# Patient Record
Sex: FEMALE | HISPANIC OR LATINO
[De-identification: names, ages, dates, MRNs, and addresses within clinical notes are randomized per-mention and may not be internally consistent; named-entity substitution may affect disease eponyms.]

---

## 2017-01-31 ENCOUNTER — MEDICATION RENEWAL (OUTPATIENT)
Age: 37
End: 2017-01-31

## 2017-03-31 ENCOUNTER — MEDICATION RENEWAL (OUTPATIENT)
Age: 37
End: 2017-03-31

## 2017-04-17 ENCOUNTER — MEDICATION RENEWAL (OUTPATIENT)
Age: 37
End: 2017-04-17

## 2017-05-11 ENCOUNTER — MEDICATION RENEWAL (OUTPATIENT)
Age: 37
End: 2017-05-11

## 2017-05-18 ENCOUNTER — MEDICATION RENEWAL (OUTPATIENT)
Age: 37
End: 2017-05-18

## 2017-08-11 ENCOUNTER — APPOINTMENT (OUTPATIENT)
Dept: ENDOCRINOLOGY | Facility: CLINIC | Age: 37
End: 2017-08-11

## 2017-10-30 ENCOUNTER — TRANSCRIPTION ENCOUNTER (OUTPATIENT)
Age: 37
End: 2017-10-30

## 2017-11-03 ENCOUNTER — TRANSCRIPTION ENCOUNTER (OUTPATIENT)
Age: 37
End: 2017-11-03

## 2017-11-10 ENCOUNTER — APPOINTMENT (OUTPATIENT)
Dept: ENDOCRINOLOGY | Facility: CLINIC | Age: 37
End: 2017-11-10
Payer: COMMERCIAL

## 2017-11-10 VITALS
HEART RATE: 68 BPM | DIASTOLIC BLOOD PRESSURE: 79 MMHG | HEIGHT: 64 IN | WEIGHT: 171 LBS | BODY MASS INDEX: 29.19 KG/M2 | SYSTOLIC BLOOD PRESSURE: 117 MMHG

## 2017-11-10 PROCEDURE — 99214 OFFICE O/P EST MOD 30 MIN: CPT

## 2017-11-10 RX ORDER — SERTRALINE HYDROCHLORIDE 100 MG/1
100 TABLET, FILM COATED ORAL
Qty: 30 | Refills: 0 | Status: ACTIVE | COMMUNITY
Start: 2017-04-06

## 2017-11-13 ENCOUNTER — TRANSCRIPTION ENCOUNTER (OUTPATIENT)
Age: 37
End: 2017-11-13

## 2017-11-13 LAB
ALBUMIN SERPL ELPH-MCNC: 4.3 G/DL
ALP BLD-CCNC: 51 U/L
ALT SERPL-CCNC: 16 U/L
ANION GAP SERPL CALC-SCNC: 14 MMOL/L
AST SERPL-CCNC: 13 U/L
BILIRUB SERPL-MCNC: 0.2 MG/DL
BUN SERPL-MCNC: 12 MG/DL
CALCIUM SERPL-MCNC: 9.3 MG/DL
CHLORIDE SERPL-SCNC: 100 MMOL/L
CHOLEST SERPL-MCNC: 289 MG/DL
CHOLEST/HDLC SERPL: 6.4 RATIO
CO2 SERPL-SCNC: 28 MMOL/L
CREAT SERPL-MCNC: 0.49 MG/DL
CREAT SPEC-SCNC: 29 MG/DL
GLUCOSE SERPL-MCNC: 92 MG/DL
HBA1C MFR BLD HPLC: 7 %
HDLC SERPL-MCNC: 45 MG/DL
LDLC SERPL CALC-MCNC: 180 MG/DL
MICROALBUMIN 24H UR DL<=1MG/L-MCNC: 0.7 MG/DL
MICROALBUMIN/CREAT 24H UR-RTO: 24 MG/G
POTASSIUM SERPL-SCNC: 3.7 MMOL/L
PROT SERPL-MCNC: 7.3 G/DL
SODIUM SERPL-SCNC: 142 MMOL/L
TRIGL SERPL-MCNC: 319 MG/DL
TSH SERPL-ACNC: 3.4 UIU/ML

## 2017-11-13 RX ORDER — LIRAGLUTIDE 6 MG/ML
18 INJECTION SUBCUTANEOUS
Qty: 1 | Refills: 5 | Status: DISCONTINUED | COMMUNITY
Start: 2017-11-10 | End: 2017-11-13

## 2018-02-09 ENCOUNTER — MEDICATION RENEWAL (OUTPATIENT)
Age: 38
End: 2018-02-09

## 2018-02-12 ENCOUNTER — APPOINTMENT (OUTPATIENT)
Dept: ENDOCRINOLOGY | Facility: CLINIC | Age: 38
End: 2018-02-12
Payer: COMMERCIAL

## 2018-02-12 VITALS
BODY MASS INDEX: 27.53 KG/M2 | DIASTOLIC BLOOD PRESSURE: 72 MMHG | HEIGHT: 64 IN | SYSTOLIC BLOOD PRESSURE: 113 MMHG | WEIGHT: 161.25 LBS | HEART RATE: 66 BPM

## 2018-02-12 PROCEDURE — 99214 OFFICE O/P EST MOD 30 MIN: CPT

## 2018-02-13 LAB
ANION GAP SERPL CALC-SCNC: 12 MMOL/L
BUN SERPL-MCNC: 12 MG/DL
CALCIUM SERPL-MCNC: 9.6 MG/DL
CHLORIDE SERPL-SCNC: 99 MMOL/L
CO2 SERPL-SCNC: 27 MMOL/L
CREAT SERPL-MCNC: 0.68 MG/DL
GLUCOSE SERPL-MCNC: 209 MG/DL
HBA1C MFR BLD HPLC: 6.6 %
POTASSIUM SERPL-SCNC: 4.1 MMOL/L
SODIUM SERPL-SCNC: 138 MMOL/L

## 2018-08-10 ENCOUNTER — APPOINTMENT (OUTPATIENT)
Dept: ENDOCRINOLOGY | Facility: CLINIC | Age: 38
End: 2018-08-10
Payer: COMMERCIAL

## 2018-08-10 VITALS
SYSTOLIC BLOOD PRESSURE: 115 MMHG | BODY MASS INDEX: 25.95 KG/M2 | HEART RATE: 68 BPM | DIASTOLIC BLOOD PRESSURE: 72 MMHG | HEIGHT: 64 IN | WEIGHT: 152 LBS

## 2018-08-10 PROCEDURE — 99213 OFFICE O/P EST LOW 20 MIN: CPT

## 2018-08-20 LAB
ANION GAP SERPL CALC-SCNC: 14 MMOL/L
BUN SERPL-MCNC: 11 MG/DL
CALCIUM SERPL-MCNC: 9.1 MG/DL
CHLORIDE SERPL-SCNC: 100 MMOL/L
CHOLEST SERPL-MCNC: 178 MG/DL
CHOLEST/HDLC SERPL: 4.3 RATIO
CO2 SERPL-SCNC: 27 MMOL/L
CREAT SERPL-MCNC: 0.65 MG/DL
CREAT SPEC-SCNC: 182 MG/DL
GLUCOSE SERPL-MCNC: 132 MG/DL
HBA1C MFR BLD HPLC: 7.1 %
HDLC SERPL-MCNC: 41 MG/DL
LDLC SERPL CALC-MCNC: 92 MG/DL
MICROALBUMIN 24H UR DL<=1MG/L-MCNC: 1.2 MG/DL
MICROALBUMIN/CREAT 24H UR-RTO: 7 MG/G
POTASSIUM SERPL-SCNC: 4.4 MMOL/L
SODIUM SERPL-SCNC: 141 MMOL/L
TRIGL SERPL-MCNC: 226 MG/DL

## 2019-01-09 ENCOUNTER — RX RENEWAL (OUTPATIENT)
Age: 39
End: 2019-01-09

## 2019-01-16 ENCOUNTER — APPOINTMENT (OUTPATIENT)
Dept: ENDOCRINOLOGY | Facility: CLINIC | Age: 39
End: 2019-01-16
Payer: COMMERCIAL

## 2019-01-16 VITALS
BODY MASS INDEX: 27.14 KG/M2 | HEIGHT: 64 IN | SYSTOLIC BLOOD PRESSURE: 105 MMHG | HEART RATE: 68 BPM | DIASTOLIC BLOOD PRESSURE: 64 MMHG | WEIGHT: 159 LBS

## 2019-01-16 PROCEDURE — 99214 OFFICE O/P EST MOD 30 MIN: CPT

## 2019-01-16 RX ORDER — DULAGLUTIDE 1.5 MG/.5ML
1.5 INJECTION, SOLUTION SUBCUTANEOUS
Qty: 2 | Refills: 5 | Status: DISCONTINUED | COMMUNITY
Start: 2017-11-13 | End: 2019-01-16

## 2019-01-16 NOTE — HISTORY OF PRESENT ILLNESS
[FreeTextEntry1] : here with daughter\par glucose have been increasing, now taking Humalog with every meal.\par going to [place] next week to bring back . gets stressed taking care of two daughters by herself.\par last night, was ravenous, after eating cereal for dinner, was still hungry and ate farina.\par loves to eat\par Trulicity not suppressing appetite like it did in the beginning\par glucose usually in the 200s.  in the mornings, 160-170, nothing below 130.\par no polyuria, polydipsia, SOB, chest pain, or neuropathy symptoms \par \par Meds:\par metformin ER 750mg TID\par Humalog 6-12 units for carby meals\par atorvastatin 10mg\par lisinopril 2.5mg\par zoloft 100mg\par clindamycin gel for acne\par Previously tried: victoza (nausea)

## 2019-01-16 NOTE — DATA REVIEWED
[FreeTextEntry1] : 8/18: A1c 7.1%, tot chol 178, trig 226, HDL 41, LDL 92, urine microalb/cr 7\par 11/17: A1c 7.0%, tot chol 289, trig 319, HDL 45, , urine microalb/cr 24, TSH 3.40\par 12/16: A1c 6.5%, Cr 0.58, TSH 1.74, urine microalb/cr 10\par 6/16: A1c 6.2%, Cr 0.65\par 2/4/16: A1c 6.0%, fructosamine 232

## 2019-01-16 NOTE — ASSESSMENT
[FreeTextEntry1] : Diabetes, A1c goal < 7.0%.   Change Trulicity to Bydureon (different GLP1 and more likely to last 7 days); continue metformin and Humalog. Advised pt to add protein and fats to her meals.  Right now, is eating mostly carbs for meals which is less filling and she may get hungry again soon after eating. \par RTO 6 months

## 2019-01-16 NOTE — PHYSICAL EXAM
[Alert] : alert [Healthy Appearance] : healthy appearance [Normal Voice/Communication] : normal voice communication [No Proptosis] : no proptosis [No Lid Lag] : no lid lag [Normal Hearing] : hearing was normal [Thyroid Not Enlarged] : the thyroid was not enlarged [No Thyroid Nodules] : there were no palpable thyroid nodules [Clear to Auscultation] : lungs were clear to auscultation bilaterally [Normal S1, S2] : normal S1 and S2 [Regular Rhythm] : with a regular rhythm [Pedal Pulses Normal] : the pedal pulses are present [No Edema] : there was no peripheral edema [No Stigmata of Cushings Syndrome] : no stigmata of cushings syndrome [Normal Sensation on Monofilament Testing] : normal sensation on monofilament testing of lower extremities [Normal Affect] : the affect was normal [Normal Mood] : the mood was normal [Foot Ulcers] : no foot ulcers [de-identified] : moderate acanthosis nigricans

## 2019-01-17 LAB
ANION GAP SERPL CALC-SCNC: 13 MMOL/L
BUN SERPL-MCNC: 12 MG/DL
CALCIUM SERPL-MCNC: 10 MG/DL
CHLORIDE SERPL-SCNC: 97 MMOL/L
CO2 SERPL-SCNC: 28 MMOL/L
CREAT SERPL-MCNC: 0.69 MG/DL
GLUCOSE SERPL-MCNC: 120 MG/DL
HBA1C MFR BLD HPLC: 7.5 %
POTASSIUM SERPL-SCNC: 3.9 MMOL/L
SODIUM SERPL-SCNC: 138 MMOL/L

## 2019-02-08 ENCOUNTER — APPOINTMENT (OUTPATIENT)
Dept: ENDOCRINOLOGY | Facility: CLINIC | Age: 39
End: 2019-02-08

## 2019-03-25 ENCOUNTER — MED ADMIN CHARGE (OUTPATIENT)
Age: 39
End: 2019-03-25

## 2019-03-25 ENCOUNTER — MEDICATION RENEWAL (OUTPATIENT)
Age: 39
End: 2019-03-25

## 2019-05-17 ENCOUNTER — APPOINTMENT (OUTPATIENT)
Dept: ENDOCRINOLOGY | Facility: CLINIC | Age: 39
End: 2019-05-17

## 2019-06-10 ENCOUNTER — TRANSCRIPTION ENCOUNTER (OUTPATIENT)
Age: 39
End: 2019-06-10

## 2019-06-12 ENCOUNTER — APPOINTMENT (OUTPATIENT)
Dept: ENDOCRINOLOGY | Facility: CLINIC | Age: 39
End: 2019-06-12
Payer: COMMERCIAL

## 2019-06-12 VITALS
HEIGHT: 64 IN | SYSTOLIC BLOOD PRESSURE: 113 MMHG | BODY MASS INDEX: 28 KG/M2 | HEART RATE: 68 BPM | WEIGHT: 164 LBS | DIASTOLIC BLOOD PRESSURE: 72 MMHG

## 2019-06-12 DIAGNOSIS — E11.9 TYPE 2 DIABETES MELLITUS W/OUT COMPLICATIONS: ICD-10-CM

## 2019-06-12 LAB
GLUCOSE BLDC GLUCOMTR-MCNC: 185
HBA1C MFR BLD HPLC: 8.4

## 2019-06-12 PROCEDURE — 99214 OFFICE O/P EST MOD 30 MIN: CPT | Mod: 25

## 2019-06-12 PROCEDURE — 82962 GLUCOSE BLOOD TEST: CPT

## 2019-06-12 PROCEDURE — 83036 HEMOGLOBIN GLYCOSYLATED A1C: CPT | Mod: QW

## 2019-06-12 RX ORDER — FLASH GLUCOSE SCANNING READER
EACH MISCELLANEOUS
Qty: 1 | Refills: 0 | Status: ACTIVE | COMMUNITY
Start: 2019-06-12 | End: 1900-01-01

## 2019-06-12 RX ORDER — FLASH GLUCOSE SENSOR
KIT MISCELLANEOUS
Qty: 2 | Refills: 5 | Status: ACTIVE | COMMUNITY
Start: 2019-06-12 | End: 1900-01-01

## 2019-06-13 ENCOUNTER — TRANSCRIPTION ENCOUNTER (OUTPATIENT)
Age: 39
End: 2019-06-13

## 2019-06-13 LAB
ALBUMIN SERPL ELPH-MCNC: 4.8 G/DL
ALP BLD-CCNC: 49 U/L
ALT SERPL-CCNC: 28 U/L
ANION GAP SERPL CALC-SCNC: 11 MMOL/L
AST SERPL-CCNC: 15 U/L
BASOPHILS # BLD AUTO: 0.06 K/UL
BASOPHILS NFR BLD AUTO: 0.8 %
BILIRUB SERPL-MCNC: 0.4 MG/DL
BUN SERPL-MCNC: 14 MG/DL
CALCIUM SERPL-MCNC: 9.6 MG/DL
CHLORIDE SERPL-SCNC: 96 MMOL/L
CHOLEST SERPL-MCNC: 374 MG/DL
CHOLEST/HDLC SERPL: 10.1 RATIO
CO2 SERPL-SCNC: 29 MMOL/L
CREAT SERPL-MCNC: 0.63 MG/DL
CREAT SPEC-SCNC: 25 MG/DL
EOSINOPHIL # BLD AUTO: 0.17 K/UL
EOSINOPHIL NFR BLD AUTO: 2.3 %
ESTIMATED AVERAGE GLUCOSE: 197 MG/DL
GLUCOSE SERPL-MCNC: 164 MG/DL
HBA1C MFR BLD HPLC: 8.5 %
HCT VFR BLD CALC: 42.8 %
HDLC SERPL-MCNC: 37 MG/DL
HGB BLD-MCNC: 14.5 G/DL
IMM GRANULOCYTES NFR BLD AUTO: 0.1 %
LDLC SERPL CALC-MCNC: NORMAL MG/DL
LYMPHOCYTES # BLD AUTO: 2.57 K/UL
LYMPHOCYTES NFR BLD AUTO: 34.5 %
MAN DIFF?: NORMAL
MCHC RBC-ENTMCNC: 30.4 PG
MCHC RBC-ENTMCNC: 33.9 GM/DL
MCV RBC AUTO: 89.7 FL
MICROALBUMIN 24H UR DL<=1MG/L-MCNC: <1.2 MG/DL
MICROALBUMIN/CREAT 24H UR-RTO: NORMAL MG/G
MONOCYTES # BLD AUTO: 0.61 K/UL
MONOCYTES NFR BLD AUTO: 8.2 %
NEUTROPHILS # BLD AUTO: 4.03 K/UL
NEUTROPHILS NFR BLD AUTO: 54.1 %
PLATELET # BLD AUTO: 277 K/UL
POTASSIUM SERPL-SCNC: 3.9 MMOL/L
PROT SERPL-MCNC: 7.4 G/DL
RBC # BLD: 4.77 M/UL
RBC # FLD: 11.7 %
SODIUM SERPL-SCNC: 136 MMOL/L
TRIGL SERPL-MCNC: 583 MG/DL
WBC # FLD AUTO: 7.45 K/UL

## 2019-06-13 NOTE — REASON FOR VISIT
[Follow-Up: _____] : a [unfilled] follow-up visit [FreeTextEntry1] : type 2 diabetes mellitus uncontrolled without complications

## 2019-06-13 NOTE — HISTORY OF PRESENT ILLNESS
[FreeTextEntry1] : Patient returns for follow up of T2DM.  This is the first time I am meeting her.\par here with daughter\par Taking humalog with every meal, no basal insulin.  Tends to take 3 units with breakfast (Edson bread, peanut butter, full banana), 5 units with lunch (meat and rice or pizza) and 4 units with dinner (meat and rice or cereal).\par Also taking metformin 750 mg BID and trulicity 1.5 mg weekly.  She has not yet switched to bydureon because she already paid for trulicity but plans to change soon.\par  is in [place], having issues with immigration, very stressful.  gets stressed taking care of two daughters by herself.\par Trulicity not suppressing appetite like it did in the beginning\par No meter today.  Glucose  in the mornings, 150-180, 1 hr after a meal will be 180-250\par \par Ophthalmology Up to date\par \par Meds:\par metformin ER 750mg TID\par Humalog 3-5 untis with meals (had been taking 6-12 units with starchy meals previously)\par atorvastatin 10mg\par lisinopril 2.5mg\par zoloft 100mg\par Trulicity 1.5\par clindamycin gel for acne\par Previously tried: victoza (nausea)

## 2019-06-13 NOTE — ASSESSMENT
[Long Term Vascular Complications] : long term vascular complications of diabetes [FreeTextEntry1] : Jeannie Robison is a 39 year old female who returns for follow up of type 2 diabetes mellitus.  This is the first time I am meeting her.\par Diabetes, A1c goal < 7.0%.   POC A1C today is 8.4% and glucose is 185 mg/dL.\par She has been under a lot of stress lately, having issues with immigration,  is in [place], she is taking care of two girls by herself.\par Change Trulicity to Bydureon (different GLP1 and more likely to last 7 days); continue metformin\par She is taking humalog with meals but per report, fasting BGs significantly elevated above goal.  Offered basal insulin, but she is not keen on a fourth daily injection.  Switch to humalog 75/25 7 units BID with breakfast and dinner.  She reliably eats three meals per day.  Discussed insulin must be taken with a meal and gave samples today.\par Check labs today for A1C, CMP. CBC, lipids, microalbumin\par Ophthalmology Up to date\par Rx Freestyle Seamus CGM\par RTC 2 months, call in one week to discuss blood sugar readings [Action and use of Insulin] : action and use of short and long-acting insulin [Importance of Diet and Exercise] : importance of diet and exercise to improve glycemic control, achieve weight loss and improve cardiovascular health [Self Monitoring of Blood Glucose] : self monitoring of blood glucose [Insulin Self-Administration] : insulin self-administration [Retinopathy Screening] : Patient was referred to ophthalmology for retinopathy screening [Injection Technique, Storage, Sharps Disposal] : injection technique, storage, and sharps disposal

## 2019-06-13 NOTE — REVIEW OF SYSTEMS
[Stress] : stress [Decreased Appetite] : appetite not decreased [Blurry Vision] : no blurred vision [de-identified] : Some tingling in fingers when she wakes up but no numbness/tingling in feet

## 2019-07-15 ENCOUNTER — MEDICATION RENEWAL (OUTPATIENT)
Age: 39
End: 2019-07-15

## 2019-07-15 RX ORDER — METFORMIN ER 750 MG 750 MG/1
750 TABLET ORAL
Qty: 60 | Refills: 5 | Status: DISCONTINUED | COMMUNITY
Start: 2017-04-25 | End: 2019-07-15

## 2019-07-16 ENCOUNTER — TRANSCRIPTION ENCOUNTER (OUTPATIENT)
Age: 39
End: 2019-07-16

## 2019-07-16 ENCOUNTER — RX RENEWAL (OUTPATIENT)
Age: 39
End: 2019-07-16

## 2019-08-27 ENCOUNTER — APPOINTMENT (OUTPATIENT)
Dept: ENDOCRINOLOGY | Facility: CLINIC | Age: 39
End: 2019-08-27
Payer: COMMERCIAL

## 2019-08-27 VITALS
HEART RATE: 59 BPM | DIASTOLIC BLOOD PRESSURE: 67 MMHG | BODY MASS INDEX: 28.15 KG/M2 | SYSTOLIC BLOOD PRESSURE: 106 MMHG | WEIGHT: 164 LBS

## 2019-08-27 PROCEDURE — 99214 OFFICE O/P EST MOD 30 MIN: CPT

## 2019-08-27 RX ORDER — DULAGLUTIDE 1.5 MG/.5ML
1.5 INJECTION, SOLUTION SUBCUTANEOUS
Qty: 4 | Refills: 5 | Status: DISCONTINUED | COMMUNITY
Start: 2019-03-25 | End: 2019-08-27

## 2019-08-28 NOTE — HISTORY OF PRESENT ILLNESS
[FreeTextEntry1] : Seamus CGM reviewed\par 14d avg   197 .  \par 30d avg   196 . highest glucose 3-6pm, in the 230s\par daily graph showing multiple spikes during day from snacking after lunch. (spike for lunch, snack and dinner)\par stress eating due to 's situation.  also is losing hair from stress.\par no polyuria, polydipsia, SOB, chest pain, or neuropathy symptoms \par \par Meds:\par metformin ER 750mg TID\par Humalog 75/25, 7 units BID with meals\par Bydureon 2mg/week\par atorvastatin 10mg\par lisinopril 2.5mg\par zoloft 100mg\par clindamycin gel for acne\par Previously tried: victoza (nausea), Trulicity (lost effectiveness)

## 2019-08-28 NOTE — ASSESSMENT
[FreeTextEntry1] : Diabetes, A1c goal < 7.0%.    Hyperglycemia due to frequent eating.\par Increase 75/25 mix: 7 units with breakfast, 10-12 units with dinner.\par recommended not eating afternoon snack.  try instead to eat earlier dinner and not have late afternoon snack.\par continue metformin and Bydureon.\par RTO 3 months

## 2019-08-28 NOTE — DATA REVIEWED
[FreeTextEntry1] : 6/19: A1c 8.5%, urine microalbumin < 1.2, tot chol 374, trig 583, HDL 37\par 1/19: A1c 7.5%\par 8/18: A1c 7.1%, tot chol 178, trig 226, HDL 41, LDL 92, urine microalb/cr 7\par 11/17: A1c 7.0%, tot chol 289, trig 319, HDL 45, , urine microalb/cr 24, TSH 3.40\par 12/16: A1c 6.5%, Cr 0.58, TSH 1.74, urine microalb/cr 10\par 6/16: A1c 6.2%, Cr 0.65\par 2/4/16: A1c 6.0%, fructosamine 232

## 2019-08-30 ENCOUNTER — TRANSCRIPTION ENCOUNTER (OUTPATIENT)
Age: 39
End: 2019-08-30

## 2019-09-04 ENCOUNTER — TRANSCRIPTION ENCOUNTER (OUTPATIENT)
Age: 39
End: 2019-09-04

## 2019-09-05 ENCOUNTER — TRANSCRIPTION ENCOUNTER (OUTPATIENT)
Age: 39
End: 2019-09-05

## 2019-09-06 ENCOUNTER — TRANSCRIPTION ENCOUNTER (OUTPATIENT)
Age: 39
End: 2019-09-06

## 2019-11-19 ENCOUNTER — APPOINTMENT (OUTPATIENT)
Dept: ENDOCRINOLOGY | Facility: CLINIC | Age: 39
End: 2019-11-19

## 2019-12-05 ENCOUNTER — APPOINTMENT (OUTPATIENT)
Dept: ENDOCRINOLOGY | Facility: CLINIC | Age: 39
End: 2019-12-05

## 2019-12-05 ENCOUNTER — APPOINTMENT (OUTPATIENT)
Dept: ENDOCRINOLOGY | Facility: CLINIC | Age: 39
End: 2019-12-05
Payer: COMMERCIAL

## 2019-12-05 VITALS
HEART RATE: 67 BPM | SYSTOLIC BLOOD PRESSURE: 108 MMHG | BODY MASS INDEX: 28.67 KG/M2 | DIASTOLIC BLOOD PRESSURE: 71 MMHG | WEIGHT: 167 LBS

## 2019-12-05 PROCEDURE — 99214 OFFICE O/P EST MOD 30 MIN: CPT

## 2019-12-05 NOTE — PHYSICAL EXAM
[Alert] : alert [Healthy Appearance] : healthy appearance [Normal Voice/Communication] : normal voice communication [No Lid Lag] : no lid lag [No Proptosis] : no proptosis [Normal Hearing] : hearing was normal [Thyroid Not Enlarged] : the thyroid was not enlarged [No Thyroid Nodules] : there were no palpable thyroid nodules [Clear to Auscultation] : lungs were clear to auscultation bilaterally [Normal S1, S2] : normal S1 and S2 [Regular Rhythm] : with a regular rhythm [Pedal Pulses Normal] : the pedal pulses are present [No Edema] : there was no peripheral edema [No Stigmata of Cushings Syndrome] : no stigmata of cushings syndrome [Normal Sensation on Monofilament Testing] : normal sensation on monofilament testing of lower extremities [Normal Affect] : the affect was normal [Normal Mood] : the mood was normal [Foot Ulcers] : no foot ulcers [de-identified] : augustin 147, decreasing [de-identified] : moderate acanthosis nigricans

## 2019-12-05 NOTE — HISTORY OF PRESENT ILLNESS
[FreeTextEntry1] : Seamus CGM reviewed\par 14d avg   179.  \par 30d avg   169 . highest from 9am to midnight.  180-220.\par today woke up at 201.  ate cereal for breakfast, took 10 units 75/25 mix, and then was 329.  [place] 3 units Humalog (has some at home), and now is 147.  usually eats alec bread in the morning (not cereal)\par thinks Bydureon is not doing much for her sugars.   Also does not feel reduction in appetite (still eating a lot) but some times will get GERD symptoms.  also getting bumps under skin.\par  still not home, maybe in a few more months, so still very stress, gets anxious\par was off Zoloft for 1 month and felt bad.  crying less when taking zoloft.\par gets palpitations, SOB, anxiety at times. (could be when kids are bickering)\par has headaches when glucose is high, and also numbness in her fingers\par no polyuria, polydipsia, SOB\par got flu vaccine.\par may be due for ophtho, needs to check\par \par Meds:\par metformin ER 750mg TID\par Humalog 75/25, 8 units with breakfast\par Bydureon 2mg/week\par atorvastatin 10mg\par lisinopril 2.5mg\par zoloft 100mg\par clindamycin gel for acne\par Previously tried: victoza (nausea), Trulicity (lost effectiveness)

## 2019-12-05 NOTE — ASSESSMENT
[FreeTextEntry1] : Diabetes, A1c goal < 7.0%.    Hyperglycemia due to eating carb-rich diet.\par Increase 75/25 mix: 10-12 units with breakfast.\par change Bydureon to Ozempic (the only GLP1 agonist she has not tried yet), 1mg/week.  can reduce dose, if she has GI symptoms with 1mg/week dose of Ozempic.\par refer to Jyothi to talk through her stress/anxiety\par RTO 3 months

## 2019-12-06 LAB
ANION GAP SERPL CALC-SCNC: 15 MMOL/L
BUN SERPL-MCNC: 12 MG/DL
CALCIUM SERPL-MCNC: 10.2 MG/DL
CHLORIDE SERPL-SCNC: 99 MMOL/L
CO2 SERPL-SCNC: 27 MMOL/L
CREAT SERPL-MCNC: 0.59 MG/DL
ESTIMATED AVERAGE GLUCOSE: 177 MG/DL
GLUCOSE SERPL-MCNC: 86 MG/DL
HBA1C MFR BLD HPLC: 7.8 %
POTASSIUM SERPL-SCNC: 4.2 MMOL/L
SODIUM SERPL-SCNC: 141 MMOL/L

## 2019-12-09 ENCOUNTER — APPOINTMENT (OUTPATIENT)
Dept: ENDOCRINOLOGY | Facility: CLINIC | Age: 39
End: 2019-12-09

## 2019-12-16 ENCOUNTER — APPOINTMENT (OUTPATIENT)
Dept: ENDOCRINOLOGY | Facility: CLINIC | Age: 39
End: 2019-12-16

## 2020-01-27 ENCOUNTER — APPOINTMENT (OUTPATIENT)
Dept: ENDOCRINOLOGY | Facility: CLINIC | Age: 40
End: 2020-01-27

## 2020-03-19 ENCOUNTER — RX RENEWAL (OUTPATIENT)
Age: 40
End: 2020-03-19

## 2020-05-14 ENCOUNTER — TRANSCRIPTION ENCOUNTER (OUTPATIENT)
Age: 40
End: 2020-05-14

## 2020-05-15 ENCOUNTER — TRANSCRIPTION ENCOUNTER (OUTPATIENT)
Age: 40
End: 2020-05-15

## 2020-05-16 ENCOUNTER — TRANSCRIPTION ENCOUNTER (OUTPATIENT)
Age: 40
End: 2020-05-16

## 2020-08-31 ENCOUNTER — APPOINTMENT (OUTPATIENT)
Dept: ENDOCRINOLOGY | Facility: CLINIC | Age: 40
End: 2020-08-31
Payer: COMMERCIAL

## 2020-08-31 VITALS
WEIGHT: 162 LBS | BODY MASS INDEX: 27.81 KG/M2 | SYSTOLIC BLOOD PRESSURE: 126 MMHG | DIASTOLIC BLOOD PRESSURE: 79 MMHG | HEART RATE: 72 BPM

## 2020-08-31 PROCEDURE — 99214 OFFICE O/P EST MOD 30 MIN: CPT

## 2020-08-31 RX ORDER — SEMAGLUTIDE 1.34 MG/ML
2 INJECTION, SOLUTION SUBCUTANEOUS
Qty: 1 | Refills: 5 | Status: ACTIVE | COMMUNITY
Start: 2019-12-05 | End: 1900-01-01

## 2020-08-31 RX ORDER — CLINDAMYCIN PHOSPHATE 10 MG/ML
1 SOLUTION TOPICAL
Qty: 60 | Refills: 0 | Status: DISCONTINUED | COMMUNITY
Start: 2017-07-27 | End: 2020-08-31

## 2020-08-31 RX ORDER — ROSUVASTATIN CALCIUM 5 MG/1
5 TABLET, FILM COATED ORAL DAILY
Qty: 30 | Refills: 5 | Status: DISCONTINUED | COMMUNITY
Start: 2019-08-27 | End: 2020-08-31

## 2020-08-31 RX ORDER — DAPSONE 75 MG/G
7.5 GEL TOPICAL
Qty: 60 | Refills: 0 | Status: ACTIVE | COMMUNITY
Start: 2020-06-23

## 2020-08-31 RX ORDER — DOXYCYCLINE HYCLATE 100 MG/1
100 TABLET ORAL
Qty: 30 | Refills: 0 | Status: ACTIVE | COMMUNITY
Start: 2020-06-23

## 2020-08-31 RX ORDER — CICLOPIROX 7.7 MG/G
0.77 GEL TOPICAL
Qty: 45 | Refills: 0 | Status: ACTIVE | COMMUNITY
Start: 2020-06-23

## 2020-08-31 NOTE — PHYSICAL EXAM
[Alert] : alert [Healthy Appearance] : healthy appearance [No Proptosis] : no proptosis [No Lid Lag] : no lid lag [Normal Hearing] : hearing was normal [No LAD] : no lymphadenopathy [Thyroid Not Enlarged] : the thyroid was not enlarged [Clear to Auscultation] : lungs were clear to auscultation bilaterally [Normal S1, S2] : normal S1 and S2 [Regular Rhythm] : with a regular rhythm [No Edema] : no peripheral edema [Pedal Pulses Normal] : the pedal pulses are present [Foot Ulcers] : no foot ulcers [Normal Sensation on Monofilament Testing] : normal sensation on monofilament testing of lower extremities [Normal Affect] : the affect was normal [Normal Mood] : the mood was normal [de-identified] : no onychomycoses, mild acanthosis nigricans

## 2020-08-31 NOTE — HISTORY OF PRESENT ILLNESS
[FreeTextEntry1] : Happier now,  is finally back home.  But still gets stressed because is working from home and kids will start school soon, on line.  Still gets anxious and feels overwhelmed sometimes.\lele was off Ozempic for a while due to nausea.  SHe would be very nauseous for 3-4 days following injection.\lele Started running again, 3x/week.  SHe used to be a runner but then was not able to with  being away and having young kids.   weight is 5 lb less than last visit.\lele has not been testing glucose recently, had run out of supplies.  glucose was 280 recently. doesn't have polyuria, polydipsia liks she used when glucose is high.  Gets headaches when glucose over 250.   no blurry vision\par some slight numbness in her fingertips, intermittently.  no neuropathy in her feet.\par \par Meds:\par metformin ER 750mg TID\par Humalog 75/25, 8 units with breakfast, 8-10 with dinner\par Ozempic 1mg/week -- not taking\par atorvastatin 10mg\par lisinopril 2.5mg\par zoloft 100mg\par clindamycin gel for acne\par Previously tried: victoza (nausea), Trulicity (lost effectiveness)

## 2020-08-31 NOTE — DATA REVIEWED
[FreeTextEntry1] : 12/19: A1c 7.8%, Cr 0.59\par 6/19: A1c 8.5%, urine microalbumin < 1.2, tot chol 374, trig 583, HDL 37\par 1/19: A1c 7.5%\par 8/18: A1c 7.1%, tot chol 178, trig 226, HDL 41, LDL 92, urine microalb/cr 7\par 11/17: A1c 7.0%, tot chol 289, trig 319, HDL 45, , urine microalb/cr 24, TSH 3.40\par 12/16: A1c 6.5%, Cr 0.58, TSH 1.74, urine microalb/cr 10\par 6/16: A1c 6.2%, Cr 0.65\par 2/4/16: A1c 6.0%, fructosamine 232

## 2020-08-31 NOTE — ASSESSMENT
[FreeTextEntry1] : Diabetes, A1c goal < 7.0%.    suboptimal (I suspect).  no known complications.\par continue pre mixed regimen.  I need more glucose readings before I can make insulin adjustments.  Will check A1c today.  adivsed to keep lunch small, since she is not injecting insulin at that time; but if eating a large lunch, can give herself 4 units "booster" dose for lunch. (but I would rather she keep a small lunch).\par Reduce Ozempic to 0.5mg/week to see if lower dose is better tolerated.  if still nauseous, can reduce further, to 0.25mg/week.\par continue metformin\par continue regular exercise; try to increase to 5x/week.  Doesn't have to run 5 days/week, can do other exercise (walking, exercise video).  Explained to pt that it is ok (and good) to take time for herself, and take a step back if she is feeling overwhelmed.\par continue statin therapy\par RTO 4 months

## 2020-09-01 LAB
ALBUMIN SERPL ELPH-MCNC: 4.6 G/DL
ALP BLD-CCNC: 51 U/L
ALT SERPL-CCNC: 18 U/L
ANION GAP SERPL CALC-SCNC: 14 MMOL/L
AST SERPL-CCNC: 12 U/L
BILIRUB SERPL-MCNC: 0.4 MG/DL
BUN SERPL-MCNC: 9 MG/DL
CALCIUM SERPL-MCNC: 9.3 MG/DL
CHLORIDE SERPL-SCNC: 100 MMOL/L
CHOLEST SERPL-MCNC: 247 MG/DL
CHOLEST/HDLC SERPL: 6.1 RATIO
CO2 SERPL-SCNC: 26 MMOL/L
CREAT SERPL-MCNC: 0.6 MG/DL
ESTIMATED AVERAGE GLUCOSE: 183 MG/DL
GLUCOSE SERPL-MCNC: 229 MG/DL
HBA1C MFR BLD HPLC: 8 %
HDLC SERPL-MCNC: 41 MG/DL
LDLC SERPL CALC-MCNC: 129 MG/DL
POTASSIUM SERPL-SCNC: 4 MMOL/L
PROT SERPL-MCNC: 7.1 G/DL
SODIUM SERPL-SCNC: 140 MMOL/L
TRIGL SERPL-MCNC: 386 MG/DL
TSH SERPL-ACNC: 2.58 UIU/ML

## 2020-09-02 LAB
CREAT SPEC-SCNC: 34 MG/DL
MICROALBUMIN 24H UR DL<=1MG/L-MCNC: <1.2 MG/DL
MICROALBUMIN/CREAT 24H UR-RTO: NORMAL MG/G

## 2021-02-08 ENCOUNTER — RX RENEWAL (OUTPATIENT)
Age: 41
End: 2021-02-08

## 2021-02-09 DIAGNOSIS — E78.00 PURE HYPERCHOLESTEROLEMIA, UNSPECIFIED: ICD-10-CM

## 2021-02-15 ENCOUNTER — RX RENEWAL (OUTPATIENT)
Age: 41
End: 2021-02-15

## 2021-03-09 ENCOUNTER — RX RENEWAL (OUTPATIENT)
Age: 41
End: 2021-03-09

## 2021-03-10 ENCOUNTER — RX RENEWAL (OUTPATIENT)
Age: 41
End: 2021-03-10

## 2021-04-19 ENCOUNTER — APPOINTMENT (OUTPATIENT)
Dept: ENDOCRINOLOGY | Facility: CLINIC | Age: 41
End: 2021-04-19

## 2021-07-09 ENCOUNTER — APPOINTMENT (OUTPATIENT)
Dept: ENDOCRINOLOGY | Facility: CLINIC | Age: 41
End: 2021-07-09
Payer: COMMERCIAL

## 2021-07-09 VITALS
BODY MASS INDEX: 27.83 KG/M2 | SYSTOLIC BLOOD PRESSURE: 116 MMHG | WEIGHT: 163 LBS | HEART RATE: 78 BPM | HEIGHT: 64 IN | DIASTOLIC BLOOD PRESSURE: 76 MMHG

## 2021-07-09 PROCEDURE — 99214 OFFICE O/P EST MOD 30 MIN: CPT

## 2021-07-09 RX ORDER — METFORMIN ER 750 MG 750 MG/1
750 TABLET ORAL
Qty: 180 | Refills: 1 | Status: ACTIVE | COMMUNITY
Start: 2019-07-12 | End: 1900-01-01

## 2021-07-09 RX ORDER — EXENATIDE 2 MG/.85ML
2 INJECTION, SUSPENSION, EXTENDED RELEASE SUBCUTANEOUS
Qty: 4 | Refills: 5 | Status: DISCONTINUED | COMMUNITY
Start: 2019-01-16 | End: 2021-07-09

## 2021-07-09 RX ORDER — SPIRONOLACTONE 50 MG/1
TABLET ORAL
Refills: 0 | Status: ACTIVE | COMMUNITY

## 2021-07-09 NOTE — DATA REVIEWED
[FreeTextEntry1] : 8/20:  A1c 8.0%, tot chol 247, trig 386, HDL 41, , TSH 2.58, urine microalbumin < 1.2\par 12/19: A1c 7.8%, Cr 0.59\par 6/19: A1c 8.5%, urine microalbumin < 1.2, tot chol 374, trig 583, HDL 37\par 1/19: A1c 7.5%\par 8/18: A1c 7.1%, tot chol 178, trig 226, HDL 41, LDL 92, urine microalb/cr 7\par 11/17: A1c 7.0%, tot chol 289, trig 319, HDL 45, , urine microalb/cr 24, TSH 3.40\par 12/16: A1c 6.5%, Cr 0.58, TSH 1.74, urine microalb/cr 10\par 6/16: A1c 6.2%, Cr 0.65\par 2/4/16: A1c 6.0%, fructosamine 232

## 2021-07-09 NOTE — ASSESSMENT
[FreeTextEntry1] : Diabetes, A1c goal < 7.0%.    suboptimal (I suspect).  no known complications.\par continue pre mixed regimen.  I need more glucose readings before I can make insulin adjustments.  Will check A1c today.  Restart Ozempic, 0.25mg/week x 3 weeks and then titrate to 0.5mg/week.  Advised to eat slowly to prevent possible nausea and GERD side effects.   IF she still feels sick on Ozempic, she can stop but then will need to increase insulin doses.  Can also try adding SGLT2 inhibitor instead of GLP1 agonist, if she cannot tolerate.\par Explained declining insulin reserves with longer duration of diabetes such that eventually, insulin is required in many patients with diabetes.  Lifestyle, including diet, weight management and physical activity, as well as glucose control contribute preserving insulin reserve longer.\par continue statin therapy\par RTO 3-4 months

## 2021-07-09 NOTE — PHYSICAL EXAM
[Alert] : alert [Healthy Appearance] : healthy appearance [No Proptosis] : no proptosis [No Lid Lag] : no lid lag [Normal Hearing] : hearing was normal [No LAD] : no lymphadenopathy [Thyroid Not Enlarged] : the thyroid was not enlarged [Clear to Auscultation] : lungs were clear to auscultation bilaterally [Normal S1, S2] : normal S1 and S2 [Regular Rhythm] : with a regular rhythm [No Edema] : no peripheral edema [Pedal Pulses Normal] : the pedal pulses are present [Normal Sensation on Monofilament Testing] : normal sensation on monofilament testing of lower extremities [Normal Affect] : the affect was normal [Normal Mood] : the mood was normal [Foot Ulcers] : no foot ulcers [de-identified] : no onychomycoses, mild acanthosis nigricans

## 2021-07-09 NOTE — HISTORY OF PRESENT ILLNESS
[FreeTextEntry1] : Still not taking Ozempic,  instead is trying a mix of bleach (?) or hydroxychloroquine?  to reduce sugars (was told by a friend)\par but sugars are still high.  Forgot her meter at home\par was wearing Seamus CGM but not recently b/c it keeps falling out in the hot weather\par sugars up to 400s.\par had improvement of tingling symptoms after having the bleach mix.\par saw optometrist but isn't sure if she had diabetic eye exam\par diet history:  cake 2x/ month (at birthday parties), beans, tortillas, PB on toast, banana, coffee.   avoids rice.\par no chest pain or SOB\par \par Meds:\par metformin ER 750mg TID\par Humalog 75/25, 8 units with breakfast, 8-10 with dinner\par Ozempic 1mg/week -- not taking\par rosuvastatin 20mg\par lisinopril 2.5mg\par zoloft 100mg\par acne meds, including spironolactone \par Previously tried: victoza (nausea), Trulicity (lost effectiveness)

## 2021-07-12 LAB
ALBUMIN SERPL ELPH-MCNC: 4.6 G/DL
ALP BLD-CCNC: 58 U/L
ALT SERPL-CCNC: 21 U/L
ANION GAP SERPL CALC-SCNC: 15 MMOL/L
AST SERPL-CCNC: 13 U/L
BILIRUB SERPL-MCNC: 0.4 MG/DL
BUN SERPL-MCNC: 11 MG/DL
CALCIUM SERPL-MCNC: 9.9 MG/DL
CHLORIDE SERPL-SCNC: 96 MMOL/L
CHOLEST SERPL-MCNC: 230 MG/DL
CO2 SERPL-SCNC: 24 MMOL/L
CREAT SERPL-MCNC: 0.57 MG/DL
ESTIMATED AVERAGE GLUCOSE: 240 MG/DL
GLUCOSE SERPL-MCNC: 274 MG/DL
HBA1C MFR BLD HPLC: 10 %
HDLC SERPL-MCNC: 43 MG/DL
LDLC SERPL CALC-MCNC: 139 MG/DL
NONHDLC SERPL-MCNC: 187 MG/DL
POTASSIUM SERPL-SCNC: 4.1 MMOL/L
PROT SERPL-MCNC: 7 G/DL
SODIUM SERPL-SCNC: 135 MMOL/L
TRIGL SERPL-MCNC: 240 MG/DL
TSH SERPL-ACNC: 2.69 UIU/ML
VIT B12 SERPL-MCNC: 1261 PG/ML

## 2021-11-17 ENCOUNTER — RX RENEWAL (OUTPATIENT)
Age: 41
End: 2021-11-17

## 2021-11-17 RX ORDER — ROSUVASTATIN CALCIUM 20 MG/1
20 TABLET, FILM COATED ORAL
Qty: 90 | Refills: 1 | Status: ACTIVE | COMMUNITY
Start: 2019-06-12 | End: 1900-01-01

## 2021-12-17 ENCOUNTER — RX RENEWAL (OUTPATIENT)
Age: 41
End: 2021-12-17

## 2021-12-17 RX ORDER — LISINOPRIL 2.5 MG/1
2.5 TABLET ORAL
Qty: 90 | Refills: 0 | Status: ACTIVE | COMMUNITY
Start: 2019-04-30 | End: 1900-01-01

## 2022-03-28 ENCOUNTER — TRANSCRIPTION ENCOUNTER (OUTPATIENT)
Age: 42
End: 2022-03-28

## 2022-03-28 RX ORDER — INSULIN ASPART 100 [IU]/ML
(70-30) 100 INJECTION, SUSPENSION SUBCUTANEOUS
Qty: 1 | Refills: 2 | Status: ACTIVE | COMMUNITY
Start: 2022-03-28 | End: 1900-01-01

## 2022-04-01 ENCOUNTER — TRANSCRIPTION ENCOUNTER (OUTPATIENT)
Age: 42
End: 2022-04-01

## 2022-04-02 ENCOUNTER — NON-APPOINTMENT (OUTPATIENT)
Age: 42
End: 2022-04-02

## 2022-04-04 ENCOUNTER — TRANSCRIPTION ENCOUNTER (OUTPATIENT)
Age: 42
End: 2022-04-04

## 2022-04-05 ENCOUNTER — NON-APPOINTMENT (OUTPATIENT)
Age: 42
End: 2022-04-05

## 2022-04-05 ENCOUNTER — TRANSCRIPTION ENCOUNTER (OUTPATIENT)
Age: 42
End: 2022-04-05

## 2022-04-06 ENCOUNTER — TRANSCRIPTION ENCOUNTER (OUTPATIENT)
Age: 42
End: 2022-04-06

## 2022-04-12 ENCOUNTER — TRANSCRIPTION ENCOUNTER (OUTPATIENT)
Age: 42
End: 2022-04-12

## 2022-04-13 ENCOUNTER — TRANSCRIPTION ENCOUNTER (OUTPATIENT)
Age: 42
End: 2022-04-13

## 2022-05-09 ENCOUNTER — RX RENEWAL (OUTPATIENT)
Age: 42
End: 2022-05-09

## 2022-05-09 RX ORDER — INSULIN LISPRO 100 [IU]/ML
(75-25) 100 INJECTION, SUSPENSION SUBCUTANEOUS
Qty: 12 | Refills: 1 | Status: ACTIVE | COMMUNITY
Start: 2019-06-12 | End: 1900-01-01

## 2022-05-23 ENCOUNTER — TRANSCRIPTION ENCOUNTER (OUTPATIENT)
Age: 42
End: 2022-05-23

## 2022-07-14 ENCOUNTER — RX RENEWAL (OUTPATIENT)
Age: 42
End: 2022-07-14

## 2022-10-28 ENCOUNTER — RX RENEWAL (OUTPATIENT)
Age: 42
End: 2022-10-28

## 2024-11-14 DIAGNOSIS — Z00.6 ENCOUNTER FOR EXAMINATION FOR NORMAL COMPARISON OR CONTROL IN CLINICAL RESEARCH PROGRAM: ICD-10-CM

## 2024-11-15 ENCOUNTER — APPOINTMENT (OUTPATIENT)
Dept: LAB | Facility: HOSPITAL | Age: 44
End: 2024-11-15
Payer: COMMERCIAL

## 2024-11-15 DIAGNOSIS — Z00.6 ENCOUNTER FOR EXAMINATION FOR NORMAL COMPARISON OR CONTROL IN CLINICAL RESEARCH PROGRAM: ICD-10-CM

## 2024-11-15 PROCEDURE — 36415 COLL VENOUS BLD VENIPUNCTURE: CPT

## 2025-02-03 LAB
APOB+LDLR+PCSK9 GENE MUT ANL BLD/T: ABNORMAL
BRCA1+BRCA2 DEL+DUP + FULL MUT ANL BLD/T: NOT DETECTED
GENE DIS ANL INTERP-IMP: POSITIVE
MLH1+MSH2+MSH6+PMS2 GN DEL+DUP+FUL M: NOT DETECTED

## 2025-02-04 ENCOUNTER — RESULTS FOLLOW-UP (OUTPATIENT)
Dept: OTHER | Facility: HOSPITAL | Age: 45
End: 2025-02-04

## 2025-02-04 NOTE — TELEPHONE ENCOUNTER
2/7/2025: Left a voicemail for Afsaneh regarding results from the [employer] research study. Second Attempt.    Update: Spoke to Afsaneh about her [employer] results, specifically FH. The participant would like a referral for genetic counseling.      2/4/2025: Left a voicemail for Afsaneh regarding [employer] results: First Attempt

## 2025-02-07 ENCOUNTER — TELEPHONE (OUTPATIENT)
Dept: OTHER | Facility: HOSPITAL | Age: 45
End: 2025-02-07

## 2025-02-07 DIAGNOSIS — R89.8 ABNORMAL GENETIC TEST: Primary | ICD-10-CM

## 2025-03-11 ENCOUNTER — OFFICE VISIT (OUTPATIENT)
Dept: OBGYN CLINIC | Facility: CLINIC | Age: 45
End: 2025-03-11
Payer: COMMERCIAL

## 2025-03-11 VITALS
SYSTOLIC BLOOD PRESSURE: 120 MMHG | BODY MASS INDEX: 27.16 KG/M2 | HEIGHT: 65 IN | DIASTOLIC BLOOD PRESSURE: 84 MMHG | WEIGHT: 163 LBS

## 2025-03-11 DIAGNOSIS — Z30.9 ENCOUNTER FOR CONTRACEPTIVE MANAGEMENT, UNSPECIFIED TYPE: Primary | ICD-10-CM

## 2025-03-11 PROBLEM — E78.5 HYPERLIPIDEMIA: Status: ACTIVE | Noted: 2020-09-18

## 2025-03-11 PROBLEM — E11.9 DIABETES MELLITUS (HCC): Status: ACTIVE | Noted: 2020-09-18

## 2025-03-11 PROCEDURE — 99203 OFFICE O/P NEW LOW 30 MIN: CPT | Performed by: NURSE PRACTITIONER

## 2025-03-11 RX ORDER — LIDOCAINE 50 MG/G
1 PATCH TOPICAL EVERY 24 HOURS
COMMUNITY
Start: 2024-12-20

## 2025-03-11 RX ORDER — LISINOPRIL 2.5 MG/1
1 TABLET ORAL EVERY MORNING
COMMUNITY
Start: 2025-01-07

## 2025-03-11 RX ORDER — SERTRALINE HYDROCHLORIDE 100 MG/1
1 TABLET, FILM COATED ORAL EVERY MORNING
COMMUNITY
Start: 2025-01-07

## 2025-03-11 RX ORDER — NAPROXEN 500 MG/1
500 TABLET ORAL
COMMUNITY
Start: 2024-12-20 | End: 2025-03-11

## 2025-03-11 RX ORDER — METFORMIN HYDROCHLORIDE 750 MG/1
750 TABLET, EXTENDED RELEASE ORAL
COMMUNITY
End: 2025-03-11

## 2025-03-11 RX ORDER — CYCLOBENZAPRINE HCL 5 MG
5 TABLET ORAL
COMMUNITY
Start: 2024-12-20 | End: 2025-03-11

## 2025-03-11 RX ORDER — OMEGA-3/DHA/EPA/FISH OIL 60 MG-90MG
500 CAPSULE ORAL DAILY
COMMUNITY
End: 2025-03-11

## 2025-03-11 RX ORDER — METFORMIN HYDROCHLORIDE 500 MG/1
TABLET, EXTENDED RELEASE ORAL
COMMUNITY

## 2025-03-11 RX ORDER — INSULIN LISPRO 100 [IU]/ML
INJECTION, SUSPENSION SUBCUTANEOUS
COMMUNITY
Start: 2024-10-01

## 2025-03-11 RX ORDER — ROSUVASTATIN CALCIUM 40 MG/1
40 TABLET, COATED ORAL DAILY
COMMUNITY

## 2025-03-11 NOTE — PROGRESS NOTES
Name: Afsaneh Dixon      : 1980      MRN: [id number]  Encounter Provider: SHARAN Choe  Encounter Date: 3/11/2025   Encounter department: [facility]  :  Assessment & Plan  Encounter for contraceptive management, unspecified type  Reviewed all forms of contraception including LARCs.  Patient is most interested in Mirena IUD removal and reinsertion  Reviewed with patient Mirena IUD is effective for 8 years.  Common side effects reviewed.  Insertion and removal procedures reviewed.  Written information provided  Encouraged to use backup method until new IUD is inserted         RTO for IUD removal and reinsertion   History of Present Illness   HPI  Afsaneh Dixon is a 45 y.o. female who presents to discuss options for contraception. LMP -uncertain.  Menses are irregular with Mirena IUD. Is sexually active using Mirena IUD for contraception.  Mirena IUD was inserted in .  Patient verbalized understanding is no longer considered effective for contraception. Medical history significant for diabetes, depression, obesity. Patient is a non-smoker.     Last pap smear 22 NILM/HR HPV negative  Last mammogram bilateral breast MRI 10/29/24 BI-RADS 2 with recommendation for diagnostic mammogram in 1 year.    History obtained from: patient    Review of Systems   Constitutional:  Negative for chills and fever.   Respiratory: Negative.     Cardiovascular: Negative.    Genitourinary: Negative.      Medical History Reviewed by provider this encounter:  Allergies  Meds  Problems     .     Objective   There were no vitals taken for this visit.     Physical Exam  Constitutional:       Appearance: Normal appearance.   Neurological:      Mental Status: She is alert and oriented to person, place, and time.   Psychiatric:         Mood and Affect: Mood normal.         Behavior: Behavior normal.

## 2025-04-09 ENCOUNTER — TELEPHONE (OUTPATIENT)
Dept: GENETICS | Facility: CLINIC | Age: 45
End: 2025-04-09

## 2025-04-09 NOTE — TELEPHONE ENCOUNTER
Scheduled Afsaneh for a genetic counseling appointment to discuss her helix test results. I explained that it may show up in her MyChart as virtual but she could ignore that - her genetic counselor will be calling her at the time of her appointment.

## 2025-06-03 ENCOUNTER — TELEPHONE (OUTPATIENT)
Dept: OBGYN CLINIC | Facility: MEDICAL CENTER | Age: 45
End: 2025-06-03

## 2025-06-03 NOTE — TELEPHONE ENCOUNTER
Who called:STAFF     Is the patient Pregnant ? no  If so, How many weeks?     Reason for the Call: To schedule an appt for IUD removal and Insertion    Action Taken: LVM    Outcome/Plan/ Recommendations: To schedule an appt for IUD removal and Insertion

## 2025-06-09 ENCOUNTER — TELEPHONE (OUTPATIENT)
Dept: OBGYN CLINIC | Facility: CLINIC | Age: 45
End: 2025-06-09

## 2025-06-09 NOTE — PROGRESS NOTES
Name: Afsaneh Dixon      : 1980      MRN: [id number]  Encounter Provider: SHARAN Choe  Encounter Date: 6/10/2025   Encounter department: [facility]  :  Assessment & Plan  Women's annual routine gynecological examination   All questions answered.  Await pap smear results.  Breast self exam technique reviewed and patient encouraged to perform self-exam monthly.  Contraception: Mirena IUD.  Diagnosis explained in detail, including differential.  Dietary diary.  Discussed healthy lifestyle modifications.  Educational material distributed.  Follow up in 1 month for string check and 1 year for annual exam.  Follow up as needed.  Mammogram.  Thin prep Pap smear.  Breast awareness reviewed  Encouraged healthy diet, exercise and lifestyle  Encouraged follow-up with PCP as needed  Gardasil vaccine series reviewed.  Written information provided.    Orders:    Liquid-based pap, screening    Encounter for IUD removal and reinsertion  Written information provided  Mirena IUD inserted 6/10/25, see procedure note        Breast cancer screening by mammogram    Orders:    Mammo diagnostic bilateral w 3d and cad; Future    Abnormal mammogram  Recommendation was for diagnostic mammogram in 1 year due 10/2025  Ordered at today's visit  Orders:    Mammo diagnostic bilateral w 3d and cad; Future    Vulvar irritation  Hygiene reviewed including avoid scented soaps, lotions and lubricants; avoid douching; avoid tight/restrictive clothing.  If no improvement can consider trial of clobetasol  Orders:    nystatin-triamcinolone (MYCOLOG-II) cream; Apply topically 2 (two) times a day    Will call/[employer] message with results  VBI-    BMI Counseling: Body mass index is 27.42 kg/m². The BMI is above normal. Nutrition recommendations include 3-5 servings of fruits/vegetables daily. Exercise recommendations include exercising 3-5 times per week.    RTO-1 year for annual exam or sooner as  "needed    History of Present Illness   HPI  Afsaneh Dixon is a 45 y.o. female who presents for annual well woman exam and IUD string check.  Last Pap smear 4/6/22 NILM/ HR HPV(-).  Last mammogram 7/18/24 BI-RADS 3; follow-up breast MRI 10/29/24 resulted BI-RADS 3 with recommendation for diagnostic bilateral mammogram in 1 year. Periods are regular every 28-30 days, lasting 4 days. No intermenstrual bleeding, spotting, or discharge.  Desires removal of IUD and reinsertion at today's visit.  Slightly reddened area on perineum noted during exam today.  Patient admits to itching some irritation occasionally    Current contraception: Mirena IUD inserted 6/10/25  History of abnormal Pap smear: no  Family history of uterine or ovarian cancer: no  Regular self breast exam: no  History of abnormal mammogram: yes   Family history of breast cancer: no  History of abnormal lipids: no  Gardasil vaccine: no      History obtained from: patient    Review of Systems   Constitutional:  Negative for chills and fever.   Respiratory: Negative.     Cardiovascular: Negative.    Genitourinary: Negative.      Medical History Reviewed by provider this encounter:  Tobacco  Allergies  Meds  Problems  Med Hx  Surg Hx  Fam Hx  Soc   Hx    .     Objective   /70   Ht 5' 5\" (1.651 m)   Wt 74.8 kg (164 lb 12.8 oz)   LMP 05/20/2025 (Approximate)   BMI 27.42 kg/m²      Physical Exam  General:   alert and oriented, in no acute distress, alert, appears stated age, and cooperative   Heart: regular rate and rhythm, S1, S2 normal, no murmur, click, rub or gallop   Lungs: clear to auscultation bilaterally   Abdomen: soft, non-tender, without masses or organomegaly   Vulva: Slightly reddened perineum, Bartholin's, Urethra, [place]'s normal, female escutcheon   Vagina: normal mucosa, normal discharge, no palpable nodules   Cervix: no bleeding following Pap, no cervical motion tenderness, no lesions, and IUD strings easily visualized " approximately 1 cm   Uterus: normal size, non-tender, normal shape and consistency   Adnexa: normal adnexa and no mass, fullness, tenderness   Breast: breasts appear normal, no suspicious masses, no skin or nipple changes or axillary nodes.        Menstrual History:  OB History          6    Para   2    Term   2            AB   4    Living   2         SAB   2    IAB   2    Ectopic        Multiple        Live Births               Obstetric Comments   Menarche age 14              Menarche age: 14  Patient's last menstrual period was 2025 (approximate).  Period Cycle (Days):  (monthly)  Period Duration (Days): 4  Period Pattern: Regular  Menstrual Flow: Light, Moderate    IUD Procedure    Date/Time: 6/10/2025 10:10 AM    Performed by: SHARAN Choe  Authorized by: SHARAN Choe    Other Assisting Provider: No    Verbal consent obtained?: Yes    Written consent obtained?: Yes    Risks and benefits: Risks, benefits and alternatives were discussed    Consent given by:  Patient  Time Out:     Time out: Immediately prior to the procedure a time out was called      Time out performed at:  6/10/2025 10:10 AM  Patient states understanding of procedure being performed: Yes    Patient's understanding of procedure matches consent: Yes    Procedure consent matches procedure scheduled: Yes    Relevant documents present and verified: Yes    Test results available and properly labeled: Yes    Site marked: No    Radiology Images displayed and confirmed. If images not available, report reviewed: No    Required items: Required blood products, implants, devices and special equipment available    Patient identity confirmed:  Verbally with patient  Select procedure: IUD removal and insertion    IUD Insertion:     Pelvic exam performed: yes      Negative GC/chlamydia test: no      Negative urine pregnancy test: no      Negative serum pregnancy test: no      Cervix cleaned and prepped: yes      Speculum  placed in vagina: yes      Tenaculum applied to cervix: no      Allis applied to cervix: no      IUD inserted with no complications: yes      Strings trimmed: yes (2 cm)      Uterus sounded: yes      Uterus sound depth (cm):  7.5    IUD type:  1 each Levonorgestrel 20 MCG/DAY  Post-procedure:     Patient tolerated procedure well: yes      Patient will follow up after next period: yes    Insertion Comments:       here for Mirena IUD removal and reinsertion. Mirena inserted .  Risks, benefits and alternatives reviewed.  Patient verbalized understanding desires Mirena removal and reinsertion at today's visit.  Verbalizes understanding Mirena IUD is effective for 8 years.  Common side effect including irregular/unpredictable vaginal bleeding along with amenorrhea reviewed  Signs and symptoms to report reviewed  RTO 1 month for string check  IUD inserted without difficulty.  Patient tolerated well.  IUD Removal:     Reason for removal: patient request      Reason for removal comment:  Due for removal    Removed without complications: yes      Tenaculum/Allis/Ring Forceps applied to cervix: no      Strings visualized: yes      IUD intact: yes      Performed with ultrasound guidance: no    Removal Comments:       here for Mirena IUD removal and reinsertion.  Risks, benefits and alternatives reviewed.  IUD removed without difficulty, intact patient tolerated well

## 2025-06-09 NOTE — TELEPHONE ENCOUNTER
Left voicemail for patient to call office to let us know what she is coming in for, she has an appointment scheduled with Didi on 6/10/25 in [place], it was scheduled through [facility] and there is no appointment note.

## 2025-06-09 NOTE — TELEPHONE ENCOUNTER
Afsaneh is scheduled for an appointment tomorrow. I left a voicemail for her to call the office back to let us know what she is coming in for.

## 2025-06-10 ENCOUNTER — OFFICE VISIT (OUTPATIENT)
Dept: OBGYN CLINIC | Facility: CLINIC | Age: 45
End: 2025-06-10
Payer: COMMERCIAL

## 2025-06-10 VITALS
HEIGHT: 65 IN | SYSTOLIC BLOOD PRESSURE: 104 MMHG | DIASTOLIC BLOOD PRESSURE: 70 MMHG | WEIGHT: 164.8 LBS | BODY MASS INDEX: 27.46 KG/M2

## 2025-06-10 DIAGNOSIS — Z01.419 WOMEN'S ANNUAL ROUTINE GYNECOLOGICAL EXAMINATION: Primary | ICD-10-CM

## 2025-06-10 DIAGNOSIS — Z30.433 ENCOUNTER FOR IUD REMOVAL AND REINSERTION: ICD-10-CM

## 2025-06-10 DIAGNOSIS — R92.8 ABNORMAL MAMMOGRAM: ICD-10-CM

## 2025-06-10 DIAGNOSIS — N90.89 VULVAR IRRITATION: ICD-10-CM

## 2025-06-10 DIAGNOSIS — Z12.31 BREAST CANCER SCREENING BY MAMMOGRAM: ICD-10-CM

## 2025-06-10 PROCEDURE — S0612 ANNUAL GYNECOLOGICAL EXAMINA: HCPCS | Performed by: NURSE PRACTITIONER

## 2025-06-10 PROCEDURE — 58301 REMOVE INTRAUTERINE DEVICE: CPT | Performed by: NURSE PRACTITIONER

## 2025-06-10 PROCEDURE — G0145 SCR C/V CYTO,THINLAYER,RESCR: HCPCS | Performed by: NURSE PRACTITIONER

## 2025-06-10 PROCEDURE — 58300 INSERT INTRAUTERINE DEVICE: CPT | Performed by: NURSE PRACTITIONER

## 2025-06-10 PROCEDURE — G0476 HPV COMBO ASSAY CA SCREEN: HCPCS | Performed by: NURSE PRACTITIONER

## 2025-06-10 RX ORDER — NYSTATIN AND TRIAMCINOLONE ACETONIDE 100000; 1 [USP'U]/G; MG/G
CREAM TOPICAL 2 TIMES DAILY
Qty: 30 G | Refills: 1 | Status: SHIPPED | OUTPATIENT
Start: 2025-06-10

## 2025-06-11 LAB
HPV HR 12 DNA CVX QL NAA+PROBE: NEGATIVE
HPV16 DNA CVX QL NAA+PROBE: NEGATIVE
HPV18 DNA CVX QL NAA+PROBE: NEGATIVE

## 2025-06-12 ENCOUNTER — DOCUMENTATION (OUTPATIENT)
Dept: OBGYN CLINIC | Facility: CLINIC | Age: 45
End: 2025-06-12

## 2025-06-12 NOTE — PROGRESS NOTES
I called and left detailed message that appt scheduled on 7/14/25 will need to be rescheduled with Dr Heaton and appt has been cancelled for that day. Advised to call office to reschedule. CB# provided    Reschedule attempt letter mailed to patient as well.

## 2025-06-16 ENCOUNTER — RESULTS FOLLOW-UP (OUTPATIENT)
Dept: OBGYN CLINIC | Facility: CLINIC | Age: 45
End: 2025-06-16

## 2025-06-16 LAB
LAB AP GYN PRIMARY INTERPRETATION: NORMAL
Lab: NORMAL

## 2025-07-21 ENCOUNTER — NURSE TRIAGE (OUTPATIENT)
Age: 45
End: 2025-07-21

## 2025-07-21 NOTE — TELEPHONE ENCOUNTER
"REASON FOR CONVERSATION: Contraception    SYMPTOMS: irregular bleeding    OTHER HEALTH INFORMATION: IUD removed and replaced 6/10. Has been having daily vaginal bleeding since. States mostly when wiping. Denies pain    PROTOCOL DISPOSITION: Home Care    CARE ADVICE PROVIDED: bleeding expectation with new IUD; call back measures; IBU use for pain PRN    PRACTICE FOLLOW-UP: n/a      Reason for Disposition   Bleeding or spotting between periods since IUD was placed and 3 or less months (3 menstrual cycles) since IUD was placed    Answer Assessment - Initial Assessment Questions  1. TYPE: \"What type of IUD do you have?\"       Mirena  2. START DATE:  \"When was your IUD inserted?\" (e.g., date; weeks, months, years ago)       6/10  3. SYMPTOM: \"What is the main symptom (or question) you're concerned about?\"       Daily bleeding  4. ONSET: \"When did the  bleeding start?\"      Since insertion 6/10  5. VAGINAL BLEEDING: \"Are you having any unusual vaginal bleeding?\"       yes  6. ABDOMEN OR PELVIC PAIN: \"Are you having any pain in your abdomen or pelvic area?\" (Scale: 0, 1-10; none, mild, moderate, severe)      denies    Protocols used: Contraception - IUD Symptoms and Questions-Adult-[place]    "

## 2025-08-07 ENCOUNTER — TELEPHONE (OUTPATIENT)
Dept: OBGYN CLINIC | Facility: MEDICAL CENTER | Age: 45
End: 2025-08-07